# Patient Record
Sex: FEMALE | Race: BLACK OR AFRICAN AMERICAN | URBAN - METROPOLITAN AREA
[De-identification: names, ages, dates, MRNs, and addresses within clinical notes are randomized per-mention and may not be internally consistent; named-entity substitution may affect disease eponyms.]

---

## 2020-07-25 ENCOUNTER — TELEPHONE ENCOUNTER (OUTPATIENT)
Dept: URBAN - METROPOLITAN AREA CLINIC 13 | Facility: CLINIC | Age: 70
End: 2020-07-25

## 2020-07-25 RX ORDER — POLYETHYLENE GLYCOL 3350, SODIUM CHLORIDE, SODIUM BICARBONATE AND POTASSIUM CHLORIDE WITH LEMON FLAVOR 420; 11.2; 5.72; 1.48 G/4L; G/4L; G/4L; G/4L
USE AS DIRECTED POWDER, FOR SOLUTION ORAL
Qty: 1 | Refills: 0 | OUTPATIENT
Start: 2017-01-09 | End: 2017-01-23

## 2020-07-25 RX ORDER — ESOMEPRAZOLE MAGNESIUM 40 MG/1
TAKE 1 CAPSULE TWICE DAILY BEFORE MEALS CAPSULE, DELAYED RELEASE PELLETS ORAL
Qty: 60 | Refills: 5 | OUTPATIENT
Start: 2017-08-25 | End: 2018-10-17

## 2020-07-25 RX ORDER — ESOMEPRAZOLE MAGNESIUM 40 MG
TAKE 1 CAPSULE DAILY CAPSULE,DELAYED RELEASE (ENTERIC COATED) ORAL
Refills: 0 | OUTPATIENT
Start: 2009-05-11 | End: 2009-06-09

## 2020-07-25 RX ORDER — ESOMEPRAZOLE MAGNESIUM 20 MG
TAKE 1 CAPSULE DAILY CAPSULE,DELAYED RELEASE (ENTERIC COATED) ORAL
Refills: 0 | OUTPATIENT
End: 2017-12-13

## 2020-07-26 ENCOUNTER — TELEPHONE ENCOUNTER (OUTPATIENT)
Dept: URBAN - METROPOLITAN AREA CLINIC 13 | Facility: CLINIC | Age: 70
End: 2020-07-26

## 2020-07-26 RX ORDER — ALPRAZOLAM 1 MG/1
TABLET ORAL
Qty: 30 | Refills: 0 | Status: ACTIVE | COMMUNITY
Start: 2019-06-03

## 2020-07-26 RX ORDER — ALBUTEROL SULFATE 90 UG/1
AEROSOL, METERED RESPIRATORY (INHALATION)
Qty: 18 | Refills: 0 | Status: ACTIVE | COMMUNITY
Start: 2019-10-24

## 2020-07-26 RX ORDER — AZITHROMYCIN DIHYDRATE 250 MG/1
TAKE 2 TABLETS BY MOUTH TODAY, THEN TAKE 1 TABLET DAILY FOR 4 DAYS TABLET, FILM COATED ORAL
Qty: 6 | Refills: 0 | Status: ACTIVE | COMMUNITY
Start: 2018-11-29

## 2020-07-26 RX ORDER — PREDNISONE 20 MG/1
TABLET ORAL
Qty: 14 | Refills: 0 | Status: ACTIVE | COMMUNITY
Start: 2019-10-28

## 2020-07-26 RX ORDER — PAROXETINE HYDROCHLORIDE HEMIHYDRATE 20 MG/1
TAKE 1 TABLET BY MOUTH EVERY DAY TABLET, FILM COATED ORAL
Qty: 90 | Refills: 0 | Status: ACTIVE | COMMUNITY
Start: 2019-01-31

## 2020-07-26 RX ORDER — CLINDAMYCIN HYDROCHLORIDE 300 MG/1
CAPSULE ORAL
Qty: 21 | Refills: 0 | Status: ACTIVE | COMMUNITY
Start: 2019-10-02

## 2020-07-26 RX ORDER — DICLOFENAC POTASSIUM 50 MG/1
TAKE 1 TABLET BY MOUTH FOUR TIMES A DAY WITH FOOD TABLET, FILM COATED ORAL
Qty: 36 | Refills: 0 | Status: ACTIVE | COMMUNITY
Start: 2018-09-21

## 2020-07-26 RX ORDER — GABAPENTIN 400 MG/1
CAPSULE ORAL
Qty: 270 | Refills: 0 | Status: ACTIVE | COMMUNITY
Start: 2019-09-27

## 2020-07-26 RX ORDER — HYDROCODONE BITARTRATE AND HOMATROPINE METHYLBROMIDE 5; 1.5 MG/5ML; MG/5ML
SYRUP ORAL
Qty: 120 | Refills: 0 | Status: ACTIVE | COMMUNITY
Start: 2019-10-28

## 2020-07-26 RX ORDER — CEPHALEXIN 500 MG/1
TAKE 1 CAPSULE BY MOUTH TWICE A DAY CAPSULE ORAL
Qty: 20 | Refills: 0 | Status: ACTIVE | COMMUNITY
Start: 2018-10-23

## 2020-07-26 RX ORDER — HYDROCODONE BITARTRATE AND ACETAMINOPHEN 5; 325 MG/1; MG/1
TABLET ORAL
Qty: 50 | Refills: 0 | Status: ACTIVE | COMMUNITY
Start: 2019-10-02

## 2020-07-26 RX ORDER — DIAZEPAM 5 MG/1
TABLET ORAL
Qty: 2 | Refills: 0 | Status: ACTIVE | COMMUNITY
Start: 2019-09-29

## 2020-07-26 RX ORDER — METHOCARBAMOL 750 MG/1
TAKE 1 TABLET BY MOUTH FOUR TIMES A DAY TABLET, FILM COATED ORAL
Qty: 36 | Refills: 0 | Status: ACTIVE | COMMUNITY
Start: 2018-09-21

## 2020-07-26 RX ORDER — DEXLANSOPRAZOLE 60 MG/1
TAKE ONE CAPSULE BY MOUTH EVERY MORNING BEFORE BREAKFAST CAPSULE, DELAYED RELEASE ORAL
Qty: 30 | Refills: 11 | Status: ACTIVE | COMMUNITY
Start: 2018-07-11

## 2020-07-26 RX ORDER — ALPRAZOLAM 1 MG/1
TABLET ORAL
Qty: 30 | Refills: 0 | Status: ACTIVE | COMMUNITY
Start: 2019-03-21

## 2020-07-26 RX ORDER — AZITHROMYCIN DIHYDRATE 250 MG/1
TABLET, FILM COATED ORAL
Qty: 6 | Refills: 0 | Status: ACTIVE | COMMUNITY
Start: 2019-10-22

## 2020-07-26 RX ORDER — ONDANSETRON 4 MG/1
TABLET, ORALLY DISINTEGRATING ORAL
Qty: 10 | Refills: 0 | Status: ACTIVE | COMMUNITY
Start: 2019-10-26

## 2020-07-26 RX ORDER — OSELTAMIVIR PHOSPHATE 75 MG/1
TAKE 1 CAPSULE BY MOUTH TWICE A DAY CAPSULE ORAL
Qty: 10 | Refills: 0 | Status: ACTIVE | COMMUNITY
Start: 2019-01-08

## 2020-08-04 ENCOUNTER — TELEPHONE ENCOUNTER (OUTPATIENT)
Dept: URBAN - METROPOLITAN AREA CLINIC 113 | Facility: CLINIC | Age: 70
End: 2020-08-04

## 2020-08-04 VITALS — BODY MASS INDEX: 35.17 KG/M2 | HEIGHT: 64 IN | WEIGHT: 206 LBS

## 2020-08-04 RX ORDER — PREDNISONE 20 MG/1
TABLET ORAL
Qty: 14 | Refills: 0 | Status: ON HOLD | COMMUNITY
Start: 2019-10-28

## 2020-08-04 RX ORDER — CLINDAMYCIN HYDROCHLORIDE 300 MG/1
CAPSULE ORAL
Qty: 21 | Refills: 0 | Status: ON HOLD | COMMUNITY
Start: 2019-10-02

## 2020-08-04 RX ORDER — POLYETHYLENE GLYCOL 3350, SODIUM CHLORIDE, SODIUM BICARBONATE, POTASSIUM CHLORIDE 420; 11.2; 5.72; 1.48 G/4L; G/4L; G/4L; G/4L
AS DIRECTED POWDER, FOR SOLUTION ORAL ONCE
Qty: 1 KIT | Refills: 0 | OUTPATIENT
Start: 2020-08-04 | End: 2020-08-05

## 2020-08-04 RX ORDER — KRILL/OM-3/DHA/EPA/PHOSPHO/AST 1000-230MG
1 TABLET CAPSULE ORAL ONCE A DAY
Qty: 30 TABLET | Status: ACTIVE | COMMUNITY

## 2020-08-04 RX ORDER — PAROXETINE HYDROCHLORIDE HEMIHYDRATE 20 MG/1
TAKE 1 TABLET BY MOUTH EVERY DAY TABLET, FILM COATED ORAL
Qty: 90 | Refills: 0 | Status: ON HOLD | COMMUNITY
Start: 2019-01-31

## 2020-08-04 RX ORDER — ALPRAZOLAM 1 MG/1
TABLET ORAL
Qty: 30 | Refills: 0 | Status: ACTIVE | COMMUNITY
Start: 2019-03-21

## 2020-08-04 RX ORDER — METHOCARBAMOL 750 MG/1
TAKE 1 TABLET BY MOUTH FOUR TIMES A DAY TABLET, FILM COATED ORAL
Qty: 36 | Refills: 0 | Status: ON HOLD | COMMUNITY
Start: 2018-09-21

## 2020-08-04 RX ORDER — VALSARTAN AND HYDROCHLOROTHIAZIDE 160; 12.5 MG/1; MG/1
1 TABLET TABLET, FILM COATED ORAL ONCE A DAY
Status: ACTIVE | COMMUNITY

## 2020-08-04 RX ORDER — CEPHALEXIN 500 MG/1
TAKE 1 CAPSULE BY MOUTH TWICE A DAY CAPSULE ORAL
Qty: 20 | Refills: 0 | Status: ON HOLD | COMMUNITY
Start: 2018-10-23

## 2020-08-04 RX ORDER — GABAPENTIN 400 MG/1
CAPSULE ORAL
Qty: 270 | Refills: 0 | Status: ON HOLD | COMMUNITY
Start: 2019-09-27

## 2020-08-04 RX ORDER — HYDROCODONE BITARTRATE AND HOMATROPINE METHYLBROMIDE 5; 1.5 MG/5ML; MG/5ML
SYRUP ORAL
Qty: 120 | Refills: 0 | Status: ON HOLD | COMMUNITY
Start: 2019-10-28

## 2020-08-04 RX ORDER — ONDANSETRON 4 MG/1
TABLET, ORALLY DISINTEGRATING ORAL
Qty: 10 | Refills: 0 | Status: ON HOLD | COMMUNITY
Start: 2019-10-26

## 2020-08-04 RX ORDER — OSELTAMIVIR PHOSPHATE 75 MG/1
TAKE 1 CAPSULE BY MOUTH TWICE A DAY CAPSULE ORAL
Qty: 10 | Refills: 0 | Status: ON HOLD | COMMUNITY
Start: 2019-01-08

## 2020-08-04 RX ORDER — DEXLANSOPRAZOLE 60 MG/1
TAKE ONE CAPSULE BY MOUTH EVERY MORNING BEFORE BREAKFAST CAPSULE, DELAYED RELEASE ORAL
Qty: 30 | Refills: 11 | Status: ON HOLD | COMMUNITY
Start: 2018-07-11

## 2020-08-04 RX ORDER — AZITHROMYCIN DIHYDRATE 250 MG/1
TAKE 2 TABLETS BY MOUTH TODAY, THEN TAKE 1 TABLET DAILY FOR 4 DAYS TABLET, FILM COATED ORAL
Qty: 6 | Refills: 0 | Status: ON HOLD | COMMUNITY
Start: 2018-11-29

## 2020-08-04 RX ORDER — DICLOFENAC POTASSIUM 50 MG/1
TAKE 1 TABLET BY MOUTH FOUR TIMES A DAY WITH FOOD TABLET, FILM COATED ORAL
Qty: 36 | Refills: 0 | Status: ON HOLD | COMMUNITY
Start: 2018-09-21

## 2020-08-04 RX ORDER — ALBUTEROL SULFATE 90 UG/1
AEROSOL, METERED RESPIRATORY (INHALATION)
Qty: 18 | Refills: 0 | Status: ACTIVE | COMMUNITY
Start: 2019-10-24

## 2020-08-04 RX ORDER — VALSARTAN AND HYDROCHLOROTHIAZIDE 160; 12.5 MG/1; MG/1
1 TABLET TABLET, FILM COATED ORAL ONCE A DAY
Qty: 30 TABLET | Status: ON HOLD | COMMUNITY

## 2020-08-04 RX ORDER — DIAZEPAM 5 MG/1
TABLET ORAL
Qty: 2 | Refills: 0 | Status: ON HOLD | COMMUNITY
Start: 2019-09-29

## 2020-08-04 RX ORDER — HYDROCODONE BITARTRATE AND ACETAMINOPHEN 5; 325 MG/1; MG/1
TABLET ORAL
Qty: 50 | Refills: 0 | Status: ON HOLD | COMMUNITY
Start: 2019-10-02

## 2020-08-05 ENCOUNTER — TELEPHONE ENCOUNTER (OUTPATIENT)
Dept: URBAN - METROPOLITAN AREA CLINIC 113 | Facility: CLINIC | Age: 70
End: 2020-08-05

## 2020-09-08 ENCOUNTER — OFFICE VISIT (OUTPATIENT)
Dept: URBAN - METROPOLITAN AREA SURGERY CENTER 25 | Facility: SURGERY CENTER | Age: 70
End: 2020-09-08

## 2020-09-10 ENCOUNTER — OFFICE VISIT (OUTPATIENT)
Dept: URBAN - METROPOLITAN AREA SURGERY CENTER 25 | Facility: SURGERY CENTER | Age: 70
End: 2020-09-10
Payer: MEDICARE

## 2020-09-10 ENCOUNTER — CLAIMS CREATED FROM THE CLAIM WINDOW (OUTPATIENT)
Dept: URBAN - METROPOLITAN AREA CLINIC 4 | Facility: CLINIC | Age: 70
End: 2020-09-10
Payer: MEDICARE

## 2020-09-10 DIAGNOSIS — D12.0 ADENOMATOUS POLYP OF CECUM: ICD-10-CM

## 2020-09-10 DIAGNOSIS — D12.3 BENIGN NEOPLASM OF TRANSVERSE COLON: ICD-10-CM

## 2020-09-10 DIAGNOSIS — Z86.010 H/O ADENOMATOUS POLYP OF COLON: ICD-10-CM

## 2020-09-10 DIAGNOSIS — D12.0 BENIGN NEOPLASM OF CECUM: ICD-10-CM

## 2020-09-10 DIAGNOSIS — D12.3 ADENOMA OF TRANSVERSE COLON: ICD-10-CM

## 2020-09-10 PROCEDURE — 45385 COLONOSCOPY W/LESION REMOVAL: CPT | Performed by: INTERNAL MEDICINE

## 2020-09-10 PROCEDURE — 88305 TISSUE EXAM BY PATHOLOGIST: CPT | Performed by: PATHOLOGY

## 2020-09-10 PROCEDURE — G9936 PMH PLYP/NEO CO/RECT/JUN/ANS: HCPCS | Performed by: INTERNAL MEDICINE

## 2020-09-10 PROCEDURE — G8907 PT DOC NO EVENTS ON DISCHARG: HCPCS | Performed by: INTERNAL MEDICINE

## 2020-09-10 RX ORDER — ALPRAZOLAM 1 MG/1
TABLET ORAL
Qty: 30 | Refills: 0 | Status: ACTIVE | COMMUNITY
Start: 2019-03-21

## 2020-09-10 RX ORDER — PREDNISONE 20 MG/1
TABLET ORAL
Qty: 14 | Refills: 0 | Status: ON HOLD | COMMUNITY
Start: 2019-10-28

## 2020-09-10 RX ORDER — ONDANSETRON 4 MG/1
TABLET, ORALLY DISINTEGRATING ORAL
Qty: 10 | Refills: 0 | Status: ON HOLD | COMMUNITY
Start: 2019-10-26

## 2020-09-10 RX ORDER — VALSARTAN AND HYDROCHLOROTHIAZIDE 160; 12.5 MG/1; MG/1
1 TABLET TABLET, FILM COATED ORAL ONCE A DAY
Qty: 30 TABLET | Status: ON HOLD | COMMUNITY

## 2020-09-10 RX ORDER — DIAZEPAM 5 MG/1
TABLET ORAL
Qty: 2 | Refills: 0 | Status: ON HOLD | COMMUNITY
Start: 2019-09-29

## 2020-09-10 RX ORDER — DEXLANSOPRAZOLE 60 MG/1
TAKE ONE CAPSULE BY MOUTH EVERY MORNING BEFORE BREAKFAST CAPSULE, DELAYED RELEASE ORAL
Qty: 30 | Refills: 11 | Status: ON HOLD | COMMUNITY
Start: 2018-07-11

## 2020-09-10 RX ORDER — CLINDAMYCIN HYDROCHLORIDE 300 MG/1
CAPSULE ORAL
Qty: 21 | Refills: 0 | Status: ON HOLD | COMMUNITY
Start: 2019-10-02

## 2020-09-10 RX ORDER — DICLOFENAC POTASSIUM 50 MG/1
TAKE 1 TABLET BY MOUTH FOUR TIMES A DAY WITH FOOD TABLET, FILM COATED ORAL
Qty: 36 | Refills: 0 | Status: ON HOLD | COMMUNITY
Start: 2018-09-21

## 2020-09-10 RX ORDER — ALBUTEROL SULFATE 90 UG/1
AEROSOL, METERED RESPIRATORY (INHALATION)
Qty: 18 | Refills: 0 | Status: ACTIVE | COMMUNITY
Start: 2019-10-24

## 2020-09-10 RX ORDER — AZITHROMYCIN DIHYDRATE 250 MG/1
TAKE 2 TABLETS BY MOUTH TODAY, THEN TAKE 1 TABLET DAILY FOR 4 DAYS TABLET, FILM COATED ORAL
Qty: 6 | Refills: 0 | Status: ON HOLD | COMMUNITY
Start: 2018-11-29

## 2020-09-10 RX ORDER — GABAPENTIN 400 MG/1
CAPSULE ORAL
Qty: 270 | Refills: 0 | Status: ON HOLD | COMMUNITY
Start: 2019-09-27

## 2020-09-10 RX ORDER — CEPHALEXIN 500 MG/1
TAKE 1 CAPSULE BY MOUTH TWICE A DAY CAPSULE ORAL
Qty: 20 | Refills: 0 | Status: ON HOLD | COMMUNITY
Start: 2018-10-23

## 2020-09-10 RX ORDER — VALSARTAN AND HYDROCHLOROTHIAZIDE 160; 12.5 MG/1; MG/1
1 TABLET TABLET, FILM COATED ORAL ONCE A DAY
Status: ACTIVE | COMMUNITY

## 2020-09-10 RX ORDER — METHOCARBAMOL 750 MG/1
TAKE 1 TABLET BY MOUTH FOUR TIMES A DAY TABLET, FILM COATED ORAL
Qty: 36 | Refills: 0 | Status: ON HOLD | COMMUNITY
Start: 2018-09-21

## 2020-09-10 RX ORDER — HYDROCODONE BITARTRATE AND ACETAMINOPHEN 5; 325 MG/1; MG/1
TABLET ORAL
Qty: 50 | Refills: 0 | Status: ON HOLD | COMMUNITY
Start: 2019-10-02

## 2020-09-10 RX ORDER — HYDROCODONE BITARTRATE AND HOMATROPINE METHYLBROMIDE 5; 1.5 MG/5ML; MG/5ML
SYRUP ORAL
Qty: 120 | Refills: 0 | Status: ON HOLD | COMMUNITY
Start: 2019-10-28

## 2020-09-10 RX ORDER — OSELTAMIVIR PHOSPHATE 75 MG/1
TAKE 1 CAPSULE BY MOUTH TWICE A DAY CAPSULE ORAL
Qty: 10 | Refills: 0 | Status: ON HOLD | COMMUNITY
Start: 2019-01-08

## 2020-09-10 RX ORDER — KRILL/OM-3/DHA/EPA/PHOSPHO/AST 1000-230MG
1 TABLET CAPSULE ORAL ONCE A DAY
Qty: 30 TABLET | Status: ACTIVE | COMMUNITY

## 2020-09-10 RX ORDER — PAROXETINE HYDROCHLORIDE HEMIHYDRATE 20 MG/1
TAKE 1 TABLET BY MOUTH EVERY DAY TABLET, FILM COATED ORAL
Qty: 90 | Refills: 0 | Status: ON HOLD | COMMUNITY
Start: 2019-01-31

## 2021-04-13 ENCOUNTER — WEB ENCOUNTER (OUTPATIENT)
Dept: URBAN - METROPOLITAN AREA CLINIC 113 | Facility: CLINIC | Age: 71
End: 2021-04-13

## 2021-04-13 ENCOUNTER — OFFICE VISIT (OUTPATIENT)
Dept: URBAN - METROPOLITAN AREA CLINIC 113 | Facility: CLINIC | Age: 71
End: 2021-04-13
Payer: MEDICARE

## 2021-04-13 VITALS
TEMPERATURE: 98.1 F | HEIGHT: 64 IN | HEART RATE: 46 BPM | DIASTOLIC BLOOD PRESSURE: 61 MMHG | BODY MASS INDEX: 33.12 KG/M2 | RESPIRATION RATE: 18 BRPM | WEIGHT: 194 LBS | SYSTOLIC BLOOD PRESSURE: 132 MMHG

## 2021-04-13 DIAGNOSIS — R09.89 GLOBUS SENSATION: ICD-10-CM

## 2021-04-13 DIAGNOSIS — K21.9 GERD: ICD-10-CM

## 2021-04-13 PROCEDURE — 99213 OFFICE O/P EST LOW 20 MIN: CPT | Performed by: NURSE PRACTITIONER

## 2021-04-13 RX ORDER — AZITHROMYCIN DIHYDRATE 250 MG/1
TAKE 2 TABLETS BY MOUTH TODAY, THEN TAKE 1 TABLET DAILY FOR 4 DAYS TABLET, FILM COATED ORAL
Qty: 6 | Refills: 0 | Status: DISCONTINUED | COMMUNITY
Start: 2018-11-29

## 2021-04-13 RX ORDER — OMEPRAZOLE 40 MG/1
1 CAPSULE 30 MINUTES BEFORE MORNING MEAL CAPSULE, DELAYED RELEASE ORAL ONCE A DAY
Qty: 90 | Refills: 4 | OUTPATIENT
Start: 2021-04-13

## 2021-04-13 RX ORDER — GABAPENTIN 400 MG/1
CAPSULE ORAL
Qty: 270 | Refills: 0 | Status: DISCONTINUED | COMMUNITY
Start: 2019-09-27

## 2021-04-13 RX ORDER — ONDANSETRON 4 MG/1
TABLET, ORALLY DISINTEGRATING ORAL
Qty: 10 | Refills: 0 | Status: DISCONTINUED | COMMUNITY
Start: 2019-10-26

## 2021-04-13 RX ORDER — KRILL/OM-3/DHA/EPA/PHOSPHO/AST 1000-230MG
1 TABLET CAPSULE ORAL ONCE A DAY
Qty: 30 TABLET | Status: ACTIVE | COMMUNITY

## 2021-04-13 RX ORDER — OSELTAMIVIR PHOSPHATE 75 MG/1
TAKE 1 CAPSULE BY MOUTH TWICE A DAY CAPSULE ORAL
Qty: 10 | Refills: 0 | Status: DISCONTINUED | COMMUNITY
Start: 2019-01-08

## 2021-04-13 RX ORDER — DEXLANSOPRAZOLE 60 MG/1
TAKE ONE CAPSULE BY MOUTH EVERY MORNING BEFORE BREAKFAST CAPSULE, DELAYED RELEASE ORAL
Qty: 30 | Refills: 11 | Status: DISCONTINUED | COMMUNITY
Start: 2018-07-11

## 2021-04-13 RX ORDER — ESOMEPRAZOLE MAGNESIUM 40 MG/1
1 CAPSULE CAPSULE, DELAYED RELEASE ORAL ONCE A DAY
OUTPATIENT

## 2021-04-13 RX ORDER — HYDROCODONE BITARTRATE AND ACETAMINOPHEN 5; 325 MG/1; MG/1
TABLET ORAL
Qty: 50 | Refills: 0 | Status: DISCONTINUED | COMMUNITY
Start: 2019-10-02

## 2021-04-13 RX ORDER — CEPHALEXIN 500 MG/1
TAKE 1 CAPSULE BY MOUTH TWICE A DAY CAPSULE ORAL
Qty: 20 | Refills: 0 | Status: DISCONTINUED | COMMUNITY
Start: 2018-10-23

## 2021-04-13 RX ORDER — ELECTROLYTES/DEXTROSE
1 TABLET SOLUTION, ORAL ORAL ONCE A DAY
Status: ACTIVE | COMMUNITY

## 2021-04-13 RX ORDER — PREDNISONE 20 MG/1
TABLET ORAL
Qty: 14 | Refills: 0 | Status: DISCONTINUED | COMMUNITY
Start: 2019-10-28

## 2021-04-13 RX ORDER — CLINDAMYCIN HYDROCHLORIDE 300 MG/1
CAPSULE ORAL
Qty: 21 | Refills: 0 | Status: DISCONTINUED | COMMUNITY
Start: 2019-10-02

## 2021-04-13 RX ORDER — HYDROCODONE BITARTRATE AND HOMATROPINE METHYLBROMIDE 5; 1.5 MG/5ML; MG/5ML
SYRUP ORAL
Qty: 120 | Refills: 0 | Status: DISCONTINUED | COMMUNITY
Start: 2019-10-28

## 2021-04-13 RX ORDER — METHOCARBAMOL 750 MG/1
TAKE 1 TABLET BY MOUTH FOUR TIMES A DAY TABLET, FILM COATED ORAL
Qty: 36 | Refills: 0 | Status: DISCONTINUED | COMMUNITY
Start: 2018-09-21

## 2021-04-13 RX ORDER — VALSARTAN AND HYDROCHLOROTHIAZIDE 160; 12.5 MG/1; MG/1
1 TABLET TABLET, FILM COATED ORAL ONCE A DAY
Qty: 30 TABLET | Status: DISCONTINUED | COMMUNITY

## 2021-04-13 RX ORDER — ESOMEPRAZOLE MAGNESIUM 40 MG/1
1 CAPSULE CAPSULE, DELAYED RELEASE ORAL ONCE A DAY
Status: ACTIVE | COMMUNITY

## 2021-04-13 RX ORDER — ALBUTEROL SULFATE 90 UG/1
AEROSOL, METERED RESPIRATORY (INHALATION)
Qty: 18 | Refills: 0 | Status: ACTIVE | COMMUNITY
Start: 2019-10-24

## 2021-04-13 RX ORDER — DICLOFENAC POTASSIUM 50 MG/1
TAKE 1 TABLET BY MOUTH FOUR TIMES A DAY WITH FOOD TABLET, FILM COATED ORAL
Qty: 36 | Refills: 0 | Status: DISCONTINUED | COMMUNITY
Start: 2018-09-21

## 2021-04-13 RX ORDER — FAMOTIDINE 40 MG/1
1 TABLET AT BEDTIME TABLET, FILM COATED ORAL ONCE A DAY
Qty: 90 TABLETS | Refills: 3 | OUTPATIENT
Start: 2021-04-13

## 2021-04-13 RX ORDER — DIAZEPAM 5 MG/1
TABLET ORAL
Qty: 2 | Refills: 0 | Status: DISCONTINUED | COMMUNITY
Start: 2019-09-29

## 2021-04-13 RX ORDER — ALPRAZOLAM 1 MG/1
TABLET ORAL
Qty: 30 | Refills: 0 | Status: DISCONTINUED | COMMUNITY
Start: 2019-03-21

## 2021-04-13 RX ORDER — VALSARTAN AND HYDROCHLOROTHIAZIDE 160; 12.5 MG/1; MG/1
1 TABLET TABLET, FILM COATED ORAL ONCE A DAY
Status: ACTIVE | COMMUNITY

## 2021-04-13 RX ORDER — PAROXETINE HYDROCHLORIDE HEMIHYDRATE 20 MG/1
TAKE 1 TABLET BY MOUTH EVERY DAY TABLET, FILM COATED ORAL
Qty: 90 | Refills: 0 | Status: DISCONTINUED | COMMUNITY
Start: 2019-01-31

## 2021-04-13 NOTE — HPI-OTHER HISTORIES
Surveillance colonoscopy 9/10/2020: three 4 to 5 mm tubular adenomas in the transverse colon and cecum, diverticulosis in the sigmoid colon, and grade 1 nonbleeding internal hemorrhoids.  Repeat colonoscopy is recommended in 5 years for surveillance. EGD (4/7/17): findings included marked thickening at posterior glottis and diffuse mildly erythematous mucosa in the gastric body and antrum. The esophagus and duodenum were normal. Gastric biopsies show gastric mucosa with reactive foveolar hyperplasia, fibrosis and mixed inflammation, consistent with tissue adjacent to an erosion or ulcer, negative for H. pylori.  Barium swallow (4/4/17): moderate reflux, no hernia, no ulceration, no stricture.

## 2021-04-13 NOTE — HPI-TODAY'S VISIT:
70-year-old woman with a history of GERD and globus sensation presenting for evaluation of reflux.  She was last seen in the office on 3/29/2019 for follow-up regarding GERD which was managed with Dexilant and ranitidine.  Regarding recurrence of globus sensation with thickening of the glottis noted on prior EGD, ENT reevaluation was recommended.  Antireflux surgery was a consideration.  Per a follow-up nurses note/telephone encounter, the patient declined ENT referral due to symptomatic improvement on Prilosec and ranitidine with head of bed elevation.  She returns today with similar complaints. She reports the persistent sensation that something is lodged in her throat, indicating just above the sternal notch. She discontinued ranitidine when it was pulled from the market, and at some point her acid suppressive therapy was changed to Nexium 40mg daily. She has no heartburn or regurgitation on this therapy. She denies dysphagia, abdominal pain, nausea or vomiting. She is taking Advil daily for management of headaches. She has a neurology consultation tomorrow.

## 2021-07-13 ENCOUNTER — OFFICE VISIT (OUTPATIENT)
Dept: URBAN - METROPOLITAN AREA CLINIC 113 | Facility: CLINIC | Age: 71
End: 2021-07-13

## 2022-04-11 ENCOUNTER — ERX REFILL RESPONSE (OUTPATIENT)
Dept: URBAN - METROPOLITAN AREA CLINIC 113 | Facility: CLINIC | Age: 72
End: 2022-04-11

## 2022-04-11 RX ORDER — FAMOTIDINE 40 MG/1
1 TABLET AT BEDTIME TABLET, FILM COATED ORAL ONCE A DAY
Qty: 90 TABLETS | Refills: 3 | OUTPATIENT

## 2022-04-11 RX ORDER — FAMOTIDINE 40 MG/1
TAKE 1 TABLET BY MOUTH EVERYDAY AT BEDTIME TABLET, FILM COATED ORAL
Qty: 90 TABLET | Refills: 1 | OUTPATIENT

## 2022-07-25 ENCOUNTER — TELEPHONE ENCOUNTER (OUTPATIENT)
Dept: URBAN - METROPOLITAN AREA CLINIC 113 | Facility: CLINIC | Age: 72
End: 2022-07-25

## 2022-07-25 RX ORDER — OMEPRAZOLE 40 MG/1
1 CAPSULE 30 MINUTES BEFORE MORNING MEAL CAPSULE, DELAYED RELEASE ORAL ONCE A DAY
Qty: 90 | Refills: 0
Start: 2021-04-13

## 2022-09-15 ENCOUNTER — DASHBOARD ENCOUNTERS (OUTPATIENT)
Age: 72
End: 2022-09-15

## 2022-09-15 ENCOUNTER — OFFICE VISIT (OUTPATIENT)
Dept: URBAN - METROPOLITAN AREA CLINIC 113 | Facility: CLINIC | Age: 72
End: 2022-09-15
Payer: MEDICARE

## 2022-09-15 VITALS
HEART RATE: 56 BPM | RESPIRATION RATE: 14 BRPM | WEIGHT: 192 LBS | TEMPERATURE: 97.3 F | BODY MASS INDEX: 32.78 KG/M2 | HEIGHT: 64 IN | SYSTOLIC BLOOD PRESSURE: 117 MMHG | DIASTOLIC BLOOD PRESSURE: 67 MMHG

## 2022-09-15 DIAGNOSIS — R09.89 GLOBUS SENSATION: ICD-10-CM

## 2022-09-15 DIAGNOSIS — K21.9 GERD: ICD-10-CM

## 2022-09-15 PROBLEM — 235595009 GASTROESOPHAGEAL REFLUX DISEASE: Status: ACTIVE | Noted: 2021-04-13

## 2022-09-15 PROCEDURE — 99213 OFFICE O/P EST LOW 20 MIN: CPT | Performed by: NURSE PRACTITIONER

## 2022-09-15 RX ORDER — OMEPRAZOLE 40 MG/1
1 CAPSULE 30 MINUTES BEFORE MORNING MEAL CAPSULE, DELAYED RELEASE ORAL ONCE A DAY
Qty: 90 | Refills: 0 | Status: ACTIVE | COMMUNITY
Start: 2021-04-13

## 2022-09-15 RX ORDER — KRILL/OM-3/DHA/EPA/PHOSPHO/AST 1000-230MG
1 TABLET CAPSULE ORAL ONCE A DAY
Qty: 30 TABLET | Status: ON HOLD | COMMUNITY

## 2022-09-15 RX ORDER — FAMOTIDINE 40 MG/1
TAKE 1 TABLET BY MOUTH EVERYDAY AT BEDTIME TABLET, FILM COATED ORAL
Qty: 90 TABLET | Refills: 1 | Status: ACTIVE | COMMUNITY

## 2022-09-15 RX ORDER — VALSARTAN AND HYDROCHLOROTHIAZIDE 160; 12.5 MG/1; MG/1
1 TABLET TABLET, FILM COATED ORAL ONCE A DAY
Status: ACTIVE | COMMUNITY

## 2022-09-15 RX ORDER — OMEPRAZOLE 40 MG/1
1 CAPSULE 30 MINUTES BEFORE MORNING MEAL CAPSULE, DELAYED RELEASE ORAL ONCE A DAY
Qty: 90 | Refills: 3

## 2022-09-15 RX ORDER — ALBUTEROL SULFATE 90 UG/1
AEROSOL, METERED RESPIRATORY (INHALATION)
Qty: 18 | Refills: 0 | Status: ACTIVE | COMMUNITY
Start: 2019-10-24

## 2022-09-15 RX ORDER — ELECTROLYTES/DEXTROSE
1 TABLET SOLUTION, ORAL ORAL ONCE A DAY
Status: ON HOLD | COMMUNITY

## 2022-09-15 RX ORDER — FAMOTIDINE 40 MG/1
TAKE 1 TABLET BY MOUTH EVERYDAY AT BEDTIME TABLET, FILM COATED ORAL
Qty: 90 TABLETS | Refills: 3

## 2022-09-15 NOTE — HPI-TODAY'S VISIT:
71-year-old woman with a history of GERD and globus sensation presenting for evaluation of reflux. She was seen in the office in April 2021 for follow-up of chronic GERD with a persistent globus sensation, refractory to maximum acid suppression. Prior EGD revealed thickening of the glottis, for which reevaluation with ENT was recommended. Her Nexium was changed to Prilosec given prior response, and she was recommended a trial of nightly famotidine. She was referred to ENT, Dr. Quispe, with consideration for initiation of a nonnarcotic pain modulator pending clinical course. Fortunately, her symptoms have subsided. She is taking omeprazole in the morning and famotidine prior to bedtime. She denies further heartburn, regurgitation or globus sensation. No dysphagia, abdominal pain, nausea or vomiting.

## 2024-08-12 ENCOUNTER — OFFICE VISIT (OUTPATIENT)
Dept: URBAN - METROPOLITAN AREA CLINIC 113 | Facility: CLINIC | Age: 74
End: 2024-08-12
Payer: MEDICARE

## 2024-08-12 VITALS
TEMPERATURE: 97.5 F | DIASTOLIC BLOOD PRESSURE: 90 MMHG | BODY MASS INDEX: 35.85 KG/M2 | WEIGHT: 210 LBS | HEART RATE: 64 BPM | SYSTOLIC BLOOD PRESSURE: 127 MMHG | HEIGHT: 64 IN | RESPIRATION RATE: 18 BRPM

## 2024-08-12 DIAGNOSIS — R09.89 GLOBUS SENSATION: ICD-10-CM

## 2024-08-12 DIAGNOSIS — Z86.010 HISTORY OF ADENOMATOUS POLYP OF COLON: ICD-10-CM

## 2024-08-12 DIAGNOSIS — K21.9 GERD: ICD-10-CM

## 2024-08-12 PROBLEM — 429047008: Status: ACTIVE | Noted: 2024-08-12

## 2024-08-12 PROCEDURE — 99214 OFFICE O/P EST MOD 30 MIN: CPT

## 2024-08-12 RX ORDER — ELECTROLYTES/DEXTROSE
1 TABLET SOLUTION, ORAL ORAL ONCE A DAY
Status: ACTIVE | COMMUNITY

## 2024-08-12 RX ORDER — FAMOTIDINE 40 MG/1
TAKE 1 TABLET BY MOUTH EVERYDAY AT BEDTIME TABLET, FILM COATED ORAL
Qty: 90 TABLETS | Refills: 3 | OUTPATIENT

## 2024-08-12 RX ORDER — VALSARTAN AND HYDROCHLOROTHIAZIDE 160; 12.5 MG/1; MG/1
1 TABLET TABLET, FILM COATED ORAL ONCE A DAY
Status: ACTIVE | COMMUNITY

## 2024-08-12 RX ORDER — OMEPRAZOLE 40 MG/1
1 CAPSULE 30 MINUTES BEFORE MORNING MEAL CAPSULE, DELAYED RELEASE ORAL ONCE A DAY
Qty: 90 | Refills: 3 | Status: ACTIVE | COMMUNITY

## 2024-08-12 RX ORDER — B-COMPLEX WITH VITAMIN C
AS DIRECTED TABLET ORAL
Status: ACTIVE | COMMUNITY

## 2024-08-12 RX ORDER — FAMOTIDINE 40 MG/1
TAKE 1 TABLET BY MOUTH EVERYDAY AT BEDTIME TABLET, FILM COATED ORAL
Qty: 90 TABLETS | Refills: 3 | Status: ACTIVE | COMMUNITY

## 2024-08-12 RX ORDER — OMEPRAZOLE 40 MG/1
1 CAPSULE 30 MINUTES BEFORE MORNING MEAL CAPSULE, DELAYED RELEASE ORAL ONCE A DAY
Qty: 90 | Refills: 3

## 2024-08-12 NOTE — HPI-TODAY'S VISIT:
Ms. Chahal is a 73-year-old woman with a history of GERD and globus sensation present for follow-up.  She was last seen in office 9/15/2022 for routine follow-up regarding GERD and globus cessation her symptoms overall stable on omeprazole and famotidine.  Most recent labs available for review from 5/26/2024 show overall normal CBC, normal CMP.  She ran out of her Omeprazole and began to experiencing acid reflux a few weeks ago. She started Omeprazole OTC and symptoms improved. She continues Famotidine 40 mg at bedtime. She is doing overall very well and is without new concerns or symptoms. Her bowels are moving regularly without blood per rectum or melena.